# Patient Record
Sex: FEMALE | Race: WHITE | NOT HISPANIC OR LATINO | Employment: FULL TIME | URBAN - METROPOLITAN AREA
[De-identification: names, ages, dates, MRNs, and addresses within clinical notes are randomized per-mention and may not be internally consistent; named-entity substitution may affect disease eponyms.]

---

## 2022-06-22 ENCOUNTER — HOSPITAL ENCOUNTER (EMERGENCY)
Facility: CLINIC | Age: 22
Discharge: HOME | End: 2022-06-22
Attending: EMERGENCY MEDICINE | Admitting: EMERGENCY MEDICINE
Payer: COMMERCIAL

## 2022-06-22 ENCOUNTER — APPOINTMENT (OUTPATIENT)
Dept: CT IMAGING | Facility: CLINIC | Age: 22
End: 2022-06-22
Attending: EMERGENCY MEDICINE
Payer: COMMERCIAL

## 2022-06-22 VITALS
TEMPERATURE: 98.5 F | HEIGHT: 69 IN | BODY MASS INDEX: 24.44 KG/M2 | OXYGEN SATURATION: 98 % | HEART RATE: 58 BPM | RESPIRATION RATE: 16 BRPM | DIASTOLIC BLOOD PRESSURE: 77 MMHG | WEIGHT: 165 LBS | SYSTOLIC BLOOD PRESSURE: 138 MMHG

## 2022-06-22 DIAGNOSIS — R10.31 RLQ ABDOMINAL PAIN: ICD-10-CM

## 2022-06-22 LAB
ALBUMIN SERPL-MCNC: 4.4 G/DL (ref 3.5–5)
ALP SERPL-CCNC: 45 U/L (ref 45–120)
ALT SERPL W P-5'-P-CCNC: 9 U/L (ref 0–45)
ANION GAP SERPL CALCULATED.3IONS-SCNC: 5 MMOL/L (ref 5–18)
AST SERPL W P-5'-P-CCNC: 15 U/L (ref 0–40)
BILIRUB DIRECT SERPL-MCNC: 0.2 MG/DL
BILIRUB SERPL-MCNC: 0.7 MG/DL (ref 0–1)
BUN SERPL-MCNC: 7 MG/DL (ref 8–22)
CALCIUM SERPL-MCNC: 9.6 MG/DL (ref 8.5–10.5)
CHLORIDE BLD-SCNC: 105 MMOL/L (ref 98–107)
CO2 SERPL-SCNC: 28 MMOL/L (ref 22–31)
CREAT SERPL-MCNC: 0.82 MG/DL (ref 0.6–1.1)
GFR SERPL CREATININE-BSD FRML MDRD: >90 ML/MIN/1.73M2
GLUCOSE BLD-MCNC: 85 MG/DL (ref 70–125)
HCG SERPL QL: NEGATIVE
LIPASE SERPL-CCNC: <9 U/L (ref 0–52)
POTASSIUM BLD-SCNC: 3.9 MMOL/L (ref 3.5–5)
PROT SERPL-MCNC: 7.3 G/DL (ref 6–8)
SODIUM SERPL-SCNC: 138 MMOL/L (ref 136–145)

## 2022-06-22 PROCEDURE — 74176 CT ABD & PELVIS W/O CONTRAST: CPT

## 2022-06-22 PROCEDURE — 99284 EMERGENCY DEPT VISIT MOD MDM: CPT | Mod: 25

## 2022-06-22 PROCEDURE — 36415 COLL VENOUS BLD VENIPUNCTURE: CPT | Performed by: EMERGENCY MEDICINE

## 2022-06-22 PROCEDURE — 83690 ASSAY OF LIPASE: CPT | Performed by: EMERGENCY MEDICINE

## 2022-06-22 PROCEDURE — 82248 BILIRUBIN DIRECT: CPT | Performed by: EMERGENCY MEDICINE

## 2022-06-22 PROCEDURE — 84703 CHORIONIC GONADOTROPIN ASSAY: CPT | Performed by: EMERGENCY MEDICINE

## 2022-06-23 NOTE — ED PROVIDER NOTES
EMERGENCY DEPARTMENT ENCOUNTER      NAME: Tessy Gamble  AGE: 21 year old female  YOB: 2000  MRN: [id number]  EVALUATION DATE & TIME: 2022  9:17 PM    PCP: No primary care provider on file.    ED PROVIDER: Cayden Villalta D.O.      Chief Complaint   Patient presents with     Flank Pain     Abdominal Pain       FINAL IMPRESSION:  1. RLQ abdominal pain        ED COURSE & MEDICAL DECISION MAKIN:23 PM I met with the patient to gather history and to perform my initial exam. I discussed the plan for care while in the Emergency Department. PPE worn: surgical mask, gloves   11:20 PM I reassessed the patient.          Pertinent Labs & Imaging studies reviewed. (See chart for details)  21 year old female presents to the Emergency Department for evaluation of right lower quadrant and right flank pain.  Patient had a UA and CBC performed at another site, which were both unremarkable, and labs otherwise here were unremarkable.  Based on the patient's symptoms a CT scan was performed.  There is no evidence of kidney stone, appendicitis, pelvic mass, or other emergent process such as cholecystitis, cholelithiasis, bowel obstruction, volvulus, mesenteric ischemia, diverticulitis, pancreatitis.  At this time the underlying source of the patient's pain is uncertain.  I did discuss with the patient possibility of performing a pelvic ultrasound, or even a pelvic exam but at this time the patient does not feel like this is necessary.  Therefore we will discharge.  However I did instruct her to return in the next 12 to 24 hours if her symptoms not improve, or sooner if they worsen.  She verbalized understanding agreement this plan.  Otherwise she will follow-up as an outpatient.  Return precautions discussed    At the conclusion of the encounter I discussed the results of all of the tests and the disposition. The questions were answered. The patient or family acknowledged understanding and was agreeable with  "the care plan.      HPI    Patient information was obtained from: Patient     Use of : N/A        Tessy Gamble is a 21 year old female who presents abdominal pain.     Patient reports having worsening RLQ abdominal pain radiating to her back since 1:00 PM (~8 hours ago). She also endorses nausea, fever, diaphoresis, and intermittent chills. No urinary symptoms. The patient states that she went to [facility] and they \"weren't helpful,\" prompting her to be present in the ED. [facility] gave her Toradol and the patient had some relief, but still feels pain. The patient otherwise denies any other symptoms or complaints at this time.     Of note, the patient has a history of ovarian cysts and kidney stones.     Social Hx: Non-Smoker. Non-Alcohol User.     REVIEW OF SYSTEMS  Constitutional: Positive for fevers, chills, and diaphoresis. Denies chills, weight loss or weakness  Eyes:  No pain, discharge, redness  HENT:  Denies sore throat, ear pain, congestion  Respiratory: No SOB, wheeze or cough  Cardiovascular:  No CP, palpitations  GI: Positive for abdominal pain radiating to back and nausea. Denies vomiting, diarrhea  : Denies dysuria, hematuria  Musculoskeletal:  Denies any new muscle/joint pain, swelling or loss of function.  Skin:  Denies rash, pallor  Neurologic:  Denies headache, focal weakness or sensory changes  Lymph: Denies swollen nodes    All other systems negative unless noted in HPI.    PAST MEDICAL HISTORY:  History reviewed. No pertinent past medical history.    PAST SURGICAL HISTORY:  History reviewed. No pertinent surgical history.      CURRENT MEDICATIONS:    No current facility-administered medications for this encounter.     No current outpatient medications on file.         ALLERGIES:  Allergies   Allergen Reactions     Amoxicillin Hives       FAMILY HISTORY:  History reviewed. No pertinent family history.    SOCIAL HISTORY:  Social History     Socioeconomic History     " "Marital status: Single       VITALS:  Patient Vitals for the past 24 hrs:   BP Temp Temp src Pulse Resp SpO2 Height Weight   06/22/22 2314 138/77 -- -- -- 16 98 % -- --   06/22/22 2045 129/88 98.5  F (36.9  C) Temporal 58 16 99 % 1.753 m (5' 9\") 74.8 kg (165 lb)       PHYSICAL EXAM    VITAL SIGNS: /77   Pulse 58   Temp 98.5  F (36.9  C) (Temporal)   Resp 16   Ht 1.753 m (5' 9\")   Wt 74.8 kg (165 lb)   SpO2 98%   BMI 24.37 kg/m      General Appearance: Well-appearing, well-nourished, no acute distress   Head:  Normocephalic, without obvious abnormality, atraumatic  Eyes:  PERRL, conjunctiva/corneas clear, EOM's intact,  ENT:  Lips, mucosa, and tongue normal, membranes are moist without pallor  Neck:  Normal ROM, symmetrical, trachea midline    Cardio:  Regular rate and rhythm, no murmur, rub or gallop, 2+ pulses symmetric in all extremities  Pulm:  Clear to auscultation bilaterally, respirations unlabored,  Abdomen:  Soft, no rebound or guarding. Mild suprapubic and RLQ tenderness.  Musculoskeletal: Full ROM, no edema, no cyanosis, good ROM of major joints  Integument:  Warm, Dry, No erythema, No rash.    Neurologic:  Alert & oriented.  No focal deficits appreciated.  Ambulatory.  Psychiatric:  Affect normal, Judgment normal, Mood normal.      LABS  Results for orders placed or performed during the hospital encounter of 06/22/22 (from the past 24 hour(s))   Basic metabolic panel   Result Value Ref Range    Sodium 138 136 - 145 mmol/L    Potassium 3.9 3.5 - 5.0 mmol/L    Chloride 105 98 - 107 mmol/L    Carbon Dioxide (CO2) 28 22 - 31 mmol/L    Anion Gap 5 5 - 18 mmol/L    Urea Nitrogen 7 (L) 8 - 22 mg/dL    Creatinine 0.82 0.60 - 1.10 mg/dL    Calcium 9.6 8.5 - 10.5 mg/dL    Glucose 85 70 - 125 mg/dL    GFR Estimate >90 >60 mL/min/1.73m2   Hepatic function panel   Result Value Ref Range    Bilirubin Total 0.7 0.0 - 1.0 mg/dL    Bilirubin Direct 0.2 <=0.5 mg/dL    Protein Total 7.3 6.0 - 8.0 g/dL    " Albumin 4.4 3.5 - 5.0 g/dL    Alkaline Phosphatase 45 45 - 120 U/L    AST 15 0 - 40 U/L    ALT 9 0 - 45 U/L   Lipase   Result Value Ref Range    Lipase <9 0 - 52 U/L   HCG QUALitative pregnancy (blood)   Result Value Ref Range    hCG Serum Qualitative Negative Negative   Abd/pelvis CT no contrast - Stone Protocol    Narrative    EXAM: CT ABDOMEN PELVIS W/O CONTRAST  LOCATION: [facility]  DATE/TIME: 6/22/2022 10:22 PM    INDICATION: Right flank and RLQ abdominal pain  COMPARISON: None.  TECHNIQUE: CT scan of the abdomen and pelvis was performed without IV contrast. Multiplanar reformats were obtained. Dose reduction techniques were used.  CONTRAST: None.    FINDINGS:   LOWER CHEST: Normal.    HEPATOBILIARY: Normal.    PANCREAS: Normal.    SPLEEN: Normal.    ADRENAL GLANDS: Normal.    KIDNEYS/BLADDER: There is a nonobstructing 2 mm stone in the right kidney. There is a probable 8 mm hyperdense cyst in the left kidney. No hydronephrosis or hydroureter. Urinary bladder unremarkable.    BOWEL: Normal. Normal appendix.    LYMPH NODES: Normal.    VASCULATURE: Unremarkable.    PELVIC ORGANS: An IUD is positioned centrally in the uterine fundus. Trace free fluid in the cul-de-sac.    MUSCULOSKELETAL: Normal.      Impression    IMPRESSION:   1.  No obstructive uropathy or other acute abnormality in the abdomen or pelvis. Normal appendix.         UA and WBC labs from [facility] with Eric Quispe MD as authorizing provider.     UA Micro If: Clean Catch (06/22/2022 6:33 PM CDT)  Lab Results - UA Micro If: Clean Catch (06/22/2022 6:33 PM CDT)  Component Value Ref Range Test Method Analysis Time Performed At Pathologist Signature   Urine Color Yellow Straw-Yellow   06/22/2022 6:35 PM CDT [facility]     Urine Clarity Clear Clear   06/22/2022 6:35 PM CDT [facility]     Specific Gravity, Urine 1.015 1.005 - 1.030   06/22/2022 6:35 PM CDT [facility]     PH Urine 7.5 5.0 - 8.0    06/22/2022 6:35 PM CDT [facility]     Protein, Urine Qual (mg/dL) Negative Neg/Trace   06/22/2022 6:35 PM CDT [facility]     Glucose Urine Qual (mg/dL) Negative Negative   06/22/2022 6:35 PM CDT [facility]     Ketones, Urine (mg/dL) Negative Negative   06/22/2022 6:35 PM CDT [facility]     Urobilinogen, Urine (EU/dL) 0.2 <2.0   06/22/2022 6:35 PM CDT [facility]     Bilirubin Urine Negative Negative   06/22/2022 6:35 PM CDT [facility]     Blood, Urine Negative Neg/Trace   06/22/2022 6:35 PM CDT [facility]     Nitrite Urine Negative Negative   06/22/2022 6:35 PM CDT [facility]     Leukocyte Est. Negative Negative   06/22/2022 6:35 PM CDT [facility]     Urine Source Clean Catch     06/22/2022 6:35 PM CDT [facility]         WBC, Blood (06/22/2022 6:31 PM CDT)  Lab Results - WBC, Blood (06/22/2022 6:31 PM CDT)  Component Value Ref Range Test Method Analysis Time Performed At Pathologist Signature   WBC 7.5 3.5 - 10.5 x10(9)/L   06/22/2022 6:35 PM CDT [facility]         RADIOLOGY  Abd/pelvis CT no contrast - Stone Protocol   Final Result   IMPRESSION:    1.  No obstructive uropathy or other acute abnormality in the abdomen or pelvis. Normal appendix.              MEDICATIONS GIVEN IN THE EMERGENCY:  Medications - No data to display    NEW PRESCRIPTIONS STARTED AT TODAY'S ER VISIT  New Prescriptions    No medications on file      I, Mason England, am serving as a scribe to document services personally performed by Cayden Villalta D.O., based on my observations and the provider's statements to me.  I, Cayden Villalta D.O., attest that Mason England is acting in a scribe capacity, has observed my performance of the services and has documented them in accordance with my direction.    Cayden Villalta D.O.  Emergency Medicine  [facility] EMERGENCY ROOM  [address] [id number] [phone]  Dept:  [phone]     Cayden Villalta, DO  06/22/22 4989

## 2022-06-23 NOTE — ED TRIAGE NOTES
Pt arrives to ED after being seen at [facility] with c/o new onset of RLQ abdominal pain and flank pain that started around 1300. Pt had a urinalysis there and blood work. Pt has hx of kidney stones and ovarian cyst. Pt got toradol IM at 1822.      Triage Assessment     Row Name 06/22/22 2046       Triage Assessment (Adult)    Airway WDL WDL       Respiratory WDL    Respiratory WDL WDL       Skin Circulation/Temperature WDL    Skin Circulation/Temperature WDL WDL       Cardiac WDL    Cardiac WDL WDL       Peripheral/Neurovascular WDL    Peripheral Neurovascular WDL WDL       Cognitive/Neuro/Behavioral WDL    Cognitive/Neuro/Behavioral WDL WDL